# Patient Record
Sex: FEMALE | Race: WHITE | Employment: PART TIME | URBAN - METROPOLITAN AREA
[De-identification: names, ages, dates, MRNs, and addresses within clinical notes are randomized per-mention and may not be internally consistent; named-entity substitution may affect disease eponyms.]

---

## 2021-06-18 ENCOUNTER — APPOINTMENT (OUTPATIENT)
Dept: GENERAL RADIOLOGY | Age: 61
End: 2021-06-18
Payer: OTHER GOVERNMENT

## 2021-06-18 ENCOUNTER — HOSPITAL ENCOUNTER (EMERGENCY)
Age: 61
Discharge: HOME | End: 2021-06-18
Payer: OTHER GOVERNMENT

## 2021-06-18 VITALS
WEIGHT: 185 LBS | BODY MASS INDEX: 28.04 KG/M2 | TEMPERATURE: 99.4 F | HEART RATE: 87 BPM | RESPIRATION RATE: 16 BRPM | SYSTOLIC BLOOD PRESSURE: 121 MMHG | HEIGHT: 68 IN | DIASTOLIC BLOOD PRESSURE: 74 MMHG | OXYGEN SATURATION: 97 %

## 2021-06-18 DIAGNOSIS — R79.89 ELEVATED LIVER FUNCTION TESTS: ICD-10-CM

## 2021-06-18 DIAGNOSIS — J18.9 PNEUMONIA OF RIGHT LUNG DUE TO INFECTIOUS ORGANISM, UNSPECIFIED PART OF LUNG: Primary | ICD-10-CM

## 2021-06-18 LAB
A/G RATIO: 0.9 (ref 1.1–2.2)
ALBUMIN SERPL-MCNC: 3.4 G/DL (ref 3.4–5)
ALP BLD-CCNC: 227 U/L (ref 40–129)
ALT SERPL-CCNC: 102 U/L (ref 10–40)
ANION GAP SERPL CALCULATED.3IONS-SCNC: 14 MMOL/L (ref 3–16)
AST SERPL-CCNC: 54 U/L (ref 15–37)
BACTERIA: ABNORMAL /HPF
BASOPHILS ABSOLUTE: 0 K/UL (ref 0–0.2)
BASOPHILS RELATIVE PERCENT: 0.5 %
BILIRUB SERPL-MCNC: 0.5 MG/DL (ref 0–1)
BILIRUBIN URINE: ABNORMAL
BLOOD, URINE: NEGATIVE
BUN BLDV-MCNC: 17 MG/DL (ref 7–20)
CALCIUM SERPL-MCNC: 9.3 MG/DL (ref 8.3–10.6)
CHLORIDE BLD-SCNC: 98 MMOL/L (ref 99–110)
CLARITY: ABNORMAL
CO2: 18 MMOL/L (ref 21–32)
COLOR: ABNORMAL
CREAT SERPL-MCNC: 0.9 MG/DL (ref 0.6–1.2)
EOSINOPHILS ABSOLUTE: 0.1 K/UL (ref 0–0.6)
EOSINOPHILS RELATIVE PERCENT: 1.1 %
EPITHELIAL CELLS, UA: 8 /HPF (ref 0–5)
GFR AFRICAN AMERICAN: >60
GFR NON-AFRICAN AMERICAN: >60
GLOBULIN: 4 G/DL
GLUCOSE BLD-MCNC: 118 MG/DL (ref 70–99)
GLUCOSE URINE: NEGATIVE MG/DL
HCT VFR BLD CALC: 44.3 % (ref 36–48)
HEMOGLOBIN: 14.7 G/DL (ref 12–16)
HYALINE CASTS: 13 /LPF (ref 0–8)
KETONES, URINE: NEGATIVE MG/DL
LACTIC ACID, SEPSIS: 0.8 MMOL/L (ref 0.4–1.9)
LEUKOCYTE ESTERASE, URINE: ABNORMAL
LYMPHOCYTES ABSOLUTE: 0.6 K/UL (ref 1–5.1)
LYMPHOCYTES RELATIVE PERCENT: 7.3 %
MCH RBC QN AUTO: 29.9 PG (ref 26–34)
MCHC RBC AUTO-ENTMCNC: 33.1 G/DL (ref 31–36)
MCV RBC AUTO: 90.4 FL (ref 80–100)
MICROSCOPIC EXAMINATION: YES
MONOCYTES ABSOLUTE: 0.6 K/UL (ref 0–1.3)
MONOCYTES RELATIVE PERCENT: 6.3 %
NEUTROPHILS ABSOLUTE: 7.4 K/UL (ref 1.7–7.7)
NEUTROPHILS RELATIVE PERCENT: 84.8 %
NITRITE, URINE: NEGATIVE
PDW BLD-RTO: 15.6 % (ref 12.4–15.4)
PH UA: 5.5 (ref 5–8)
PLATELET # BLD: 248 K/UL (ref 135–450)
PMV BLD AUTO: 6.8 FL (ref 5–10.5)
POTASSIUM REFLEX MAGNESIUM: 3.9 MMOL/L (ref 3.5–5.1)
PROTEIN UA: ABNORMAL MG/DL
RAPID INFLUENZA  B AGN: NEGATIVE
RAPID INFLUENZA A AGN: NEGATIVE
RBC # BLD: 4.91 M/UL (ref 4–5.2)
RBC UA: ABNORMAL /HPF (ref 0–4)
SARS-COV-2, NAAT: NOT DETECTED
SODIUM BLD-SCNC: 130 MMOL/L (ref 136–145)
SPECIFIC GRAVITY UA: 1.02 (ref 1–1.03)
TOTAL PROTEIN: 7.4 G/DL (ref 6.4–8.2)
URINE REFLEX TO CULTURE: YES
URINE TYPE: ABNORMAL
UROBILINOGEN, URINE: 1 E.U./DL
WBC # BLD: 8.8 K/UL (ref 4–11)
WBC UA: 22 /HPF (ref 0–5)

## 2021-06-18 PROCEDURE — 87086 URINE CULTURE/COLONY COUNT: CPT

## 2021-06-18 PROCEDURE — 81001 URINALYSIS AUTO W/SCOPE: CPT

## 2021-06-18 PROCEDURE — 99283 EMERGENCY DEPT VISIT LOW MDM: CPT

## 2021-06-18 PROCEDURE — 71045 X-RAY EXAM CHEST 1 VIEW: CPT

## 2021-06-18 PROCEDURE — 85025 COMPLETE CBC W/AUTO DIFF WBC: CPT

## 2021-06-18 PROCEDURE — 87635 SARS-COV-2 COVID-19 AMP PRB: CPT

## 2021-06-18 PROCEDURE — 87804 INFLUENZA ASSAY W/OPTIC: CPT

## 2021-06-18 PROCEDURE — 83605 ASSAY OF LACTIC ACID: CPT

## 2021-06-18 PROCEDURE — 87040 BLOOD CULTURE FOR BACTERIA: CPT

## 2021-06-18 PROCEDURE — 80053 COMPREHEN METABOLIC PANEL: CPT

## 2021-06-18 RX ORDER — LEVOFLOXACIN 750 MG/1
750 TABLET ORAL DAILY
Qty: 7 TABLET | Refills: 0 | Status: SHIPPED | OUTPATIENT
Start: 2021-06-18 | End: 2021-06-25

## 2021-06-18 RX ORDER — GABAPENTIN 300 MG/1
300 CAPSULE ORAL DAILY
COMMUNITY

## 2021-06-18 ASSESSMENT — ENCOUNTER SYMPTOMS
EYE REDNESS: 0
WHEEZING: 0
COLOR CHANGE: 0
ABDOMINAL PAIN: 0
CHOKING: 0
APNEA: 0
CHEST TIGHTNESS: 0
COUGH: 1
CONSTIPATION: 0
VOMITING: 0
DIARRHEA: 0
BACK PAIN: 0
STRIDOR: 0
EYE DISCHARGE: 0
SHORTNESS OF BREATH: 0
NAUSEA: 0

## 2021-06-18 NOTE — ED NOTES
Bed: 22  Expected date:   Expected time:   Means of arrival:   Comments:  June Mccloud RN  06/18/21 2411

## 2021-06-19 NOTE — ED PROVIDER NOTES
[facility]        Pt Name: Juan Carlos Marroquin  MRN: [id number]  [place] 1960  Date of evaluation: 6/18/2021  Provider: HUGH Morales  PCP: Nabil Blum  Note Started: 9:01 PM EDT       BOBO. I have evaluated this patient. My supervising physician was available for consultation. CHIEF COMPLAINT       Chief Complaint   Patient presents with    Fever     pt has had fever since Monday with body aches. pt states her urine has been very dark x2 days       HISTORY OF PRESENT ILLNESS   (Location, Timing/Onset, Context/Setting, Quality, Duration, Modifying Factors, Severity, Associated Signs and Symptoms)  Note limiting factors. Juan Carlos Marroquin is a 59 y.o. female with past medical history of depression who presents to the ED with complaint of a fever. Patient states she has had fever since Monday. States fever chills with intermittent body aches. States she has had associated nonproductive cough and headache. Patient states she is vaccinated against COVID-19. Patient denies any sick contacts or recent travel. States her urine has been dark for the past couple days but denies any dysuria, frequency, urgency or hematuria. Denies vaginal bleeding or discharge. Denies nausea, vomiting, decreased oral intake, abdominal pain, changes in bowel movements, chest pain or shortness of breath. Denies rashes or lesions. Denies any neck pain or stiffness. Patient states she has generalized body aches that she rates a 6/10. Denies worst headache of life or thunderclap onset. Has been taking over-the-counter medication with improvement of fever. Became concerned and came to the ED for further evaluation treatment. Nursing Notes were all reviewed and agreed with or any disagreements were addressed in the HPI.     REVIEW OF SYSTEMS    (2-9 systems for level 4, 10 or more for level 5)     Review of Systems   Constitutional: Positive for chills and fever. Negative for activity change, appetite change, diaphoresis and fatigue. HENT: Negative. Eyes: Negative for discharge and redness. Respiratory: Positive for cough. Negative for apnea, choking, chest tightness, shortness of breath, wheezing and stridor. Cardiovascular: Negative. Negative for chest pain, palpitations and leg swelling. Gastrointestinal: Negative for abdominal pain, constipation, diarrhea, nausea and vomiting. Genitourinary: Negative for decreased urine volume, difficulty urinating, dysuria, flank pain, frequency, hematuria and urgency. Musculoskeletal: Positive for arthralgias and myalgias. Negative for back pain, gait problem, joint swelling, neck pain and neck stiffness. Skin: Negative for color change, pallor, rash and wound. Neurological: Positive for headaches. Negative for dizziness and light-headedness. Positives and Pertinent negatives as per HPI. Except as noted above in the ROS, all other systems were reviewed and negative. PAST MEDICAL HISTORY     Past Medical History:   Diagnosis Date    Depression          SURGICAL HISTORY   History reviewed. No pertinent surgical history. [address]       Discharge Medication List as of 6/18/2021  9:08 PM      CONTINUE these medications which have NOT CHANGED    Details   gabapentin (NEURONTIN) 300 MG capsule Take 300 mg by mouth daily. Historical Med      sertraline (ZOLOFT) 50 MG tablet Take 50 mg by mouth dailyHistorical Med               ALLERGIES     Patient has no known allergies. FAMILYHISTORY     History reviewed. No pertinent family history.        SOCIAL HISTORY       Social History     Tobacco Use    Smoking status: Current Every Day Smoker     Packs/day: 1.00     Types: Cigarettes   Substance Use Topics    Alcohol use: Not on file    Drug use: Not on file       SCREENINGS             PHYSICAL EXAM    (up to 7 for level 4, 8 or more for level 5)     ED Triage Neurological:      Mental Status: She is alert and oriented to person, place, and time.    Psychiatric:         Behavior: Behavior normal.         DIAGNOSTIC RESULTS   LABS:    Labs Reviewed   URINE RT REFLEX TO CULTURE - Abnormal; Notable for the following components:       Result Value    Clarity, UA CLOUDY (*)     Bilirubin Urine SMALL (*)     Protein, UA TRACE (*)     Leukocyte Esterase, Urine SMALL (*)     All other components within normal limits    Narrative:     Performed at:  [facility] [address] [facility] [employer]  [place]   Phone [phone]   CBC WITH AUTO DIFFERENTIAL - Abnormal; Notable for the following components:    RDW 15.6 (*)     Lymphocytes Absolute 0.6 (*)     All other components within normal limits    Narrative:     Performed at:  [facility] [address] [facility] [facility] [employer]   Phone [phone]   COMPREHENSIVE METABOLIC PANEL W/ REFLEX TO MG FOR LOW K - Abnormal; Notable for the following components:    Sodium 130 (*)     Chloride 98 (*)     CO2 18 (*)     Glucose 118 (*)     Albumin/Globulin Ratio 0.9 (*)     Alkaline Phosphatase 227 (*)      (*)     AST 54 (*)     All other components within normal limits    Narrative:     Performed at:  [facility] [address] [facility] [employer]   Phone [phone]   MICROSCOPIC URINALYSIS - Abnormal; Notable for the following components:    Bacteria, UA 1+ (*)     Hyaline Casts, UA 13 (*)     WBC, UA 22 (*)     Epithelial Cells, UA 8 (*)     All other components within normal limits    Narrative:     Performed at:  [facility] [address] [place] [employer]   Phone [phone]   RAPID INFLUENZA A/B ANTIGENS    Narrative:     Performed at:  [facility] [address] [place] [employer]   Phone [phone], RAPID    Narrative: Performed at:  [facility]  [address] [facility], [address]   Phone [phone]   CULTURE, BLOOD 1   CULTURE, BLOOD 2   CULTURE, URINE   LACTATE, SEPSIS    Narrative:     Performed at:  [facility]  [address] [facility], [address]   Phone [phone]       All other labs were within normal range or not returned as of this dictation. EKG: All EKG's are interpreted by the Emergency Department Physician in the absence of a cardiologist.  Please see their note for interpretation of EKG. RADIOLOGY:   Non-plain film images such as CT, Ultrasound and MRI are read by the radiologist. Plain radiographic images are visualized and preliminarily interpreted by the ED Provider with the below findings:        Interpretation per the Radiologist below, if available at the time of this note:    XR CHEST PORTABLE   Final Result   Right-sided airspace disease. Correlate with presentation for pneumonia. Follow-up to resolution recommended. XR CHEST PORTABLE    Result Date: 6/18/2021  EXAMINATION: ONE XRAY VIEW OF THE CHEST 6/18/2021 6:53 pm COMPARISON: None. HISTORY: ORDERING SYSTEM PROVIDED HISTORY: fever TECHNOLOGIST PROVIDED HISTORY: Reason for exam:->fever Reason for Exam: fever Acuity: Unknown Type of Exam: Unknown FINDINGS: Cardiac silhouette is within normal limits in size. Mediastinal contours are otherwise suboptimally evaluated due to rotated projection. Right upper/mid lung opacities. Trace right pleural effusion. Right-sided airspace disease. Correlate with presentation for pneumonia. Follow-up to resolution recommended.            PROCEDURES   Unless otherwise noted below, none     Procedures    CRITICAL CARE TIME   N/A    CONSULTS:  None      EMERGENCY DEPARTMENT COURSE and DIFFERENTIAL DIAGNOSIS/MDM:   Vitals:    Vitals:    06/18/21 2000 06/18/21 2030 06/18/21 2051 06/18/21 2100   BP: 100/68 120/80 120/80 121/74 Pulse: 89   87   Resp:    16   Temp:       TempSrc:       SpO2: 98% 97%  97%   Weight:       Height:           Patient was given the following medications:  Medications - No data to display        Patient is a 58-year-old female who presents to the ED with complaint of a fever. Has had fever, chills and body aches for the past several days. Upon arrival patient afebrile with stable vital signs. Denies any further complaints. IV established and blood work obtained. CBC showed normal white count, hemoglobin and platelets. CMP did show alk phos of 227 with  and AST of 54 but specimen was hemolyzed. Lactic acid normal.  Blood cultures x2 obtained and pending at this time. Flu swab was negative. Rapid Covid negative. Urinalysis showed 1+ bacteria 22 white blood cells. Patient suffering from fever, chills and body aches concerning for potential acute cystitis but he also has chest x-ray which showed right-sided airspace disease. Patient not hypoxic. Believe patient be safely discharged home. Will start on Levaquin. Follow-up with PCP. Return the ED for any worsening symptoms. Low suspicion for COVID-19, influenza, sepsis, respiratory distress, surgical abdomen, pyelonephritis or other emergent etiology at this time. FINAL IMPRESSION      1. Pneumonia of right lung due to infectious organism, unspecified part of lung    2. Elevated liver function tests          DISPOSITION/PLAN   DISPOSITION Decision To Discharge 06/18/2021 08:52:10 PM      PATIENT REFERRED TO:  Eugenie Grider  [address] [place] [address]  [phone]    Schedule an appointment as soon as possible for a visit   For a Re-check in   3-5   days.     [facility]  [address] [facility]  [address] [address]  [phone]  Go to   As needed, If symptoms worsen      DISCHARGE MEDICATIONS:  Discharge Medication List as of 6/18/2021  9:08 PM      START taking these medications    Details levoFLOXacin (LEVAQUIN) 750 MG tablet Take 1 tablet by mouth daily for 7 days, Disp-7 tablet, R-0Print             DISCONTINUED MEDICATIONS:  Discharge Medication List as of 6/18/2021  9:08 PM                 (Please note that portions of this note were completed with a voice recognition program.  Efforts were made to edit the dictations but occasionally words are mis-transcribed.)    HUGH Gonzalez (electronically signed)          HUGH Gilbert  06/18/21 2905

## 2021-06-20 LAB — URINE CULTURE, ROUTINE: NORMAL

## 2021-06-22 LAB
BLOOD CULTURE, ROUTINE: NORMAL
CULTURE, BLOOD 2: NORMAL

## 2022-12-28 ENCOUNTER — APPOINTMENT (OUTPATIENT)
Dept: GENERAL RADIOLOGY | Age: 62
End: 2022-12-28
Payer: OTHER GOVERNMENT

## 2022-12-28 ENCOUNTER — HOSPITAL ENCOUNTER (OUTPATIENT)
Age: 62
Setting detail: OBSERVATION
Discharge: HOME | End: 2022-12-30
Attending: INTERNAL MEDICINE | Admitting: INTERNAL MEDICINE
Payer: OTHER GOVERNMENT

## 2022-12-28 ENCOUNTER — APPOINTMENT (OUTPATIENT)
Dept: CT IMAGING | Age: 62
End: 2022-12-28
Payer: OTHER GOVERNMENT

## 2022-12-28 DIAGNOSIS — I26.93 SINGLE SUBSEGMENTAL PULMONARY EMBOLISM WITHOUT ACUTE COR PULMONALE (HCC): Primary | ICD-10-CM

## 2022-12-28 DIAGNOSIS — U07.1 COVID: ICD-10-CM

## 2022-12-28 PROBLEM — I26.99 ACUTE PULMONARY EMBOLISM WITHOUT ACUTE COR PULMONALE, UNSPECIFIED PULMONARY EMBOLISM TYPE (HCC): Status: ACTIVE | Noted: 2022-12-28

## 2022-12-28 PROBLEM — J44.9 SUSPECTED CHRONIC OBSTRUCTIVE PULMONARY DISEASE BASED ON INITIAL EVALUATION (HCC): Status: ACTIVE | Noted: 2022-12-28

## 2022-12-28 PROBLEM — Z72.0 TOBACCO ABUSE: Status: ACTIVE | Noted: 2022-12-28

## 2022-12-28 LAB
A/G RATIO: 1 (ref 1.1–2.2)
ALBUMIN SERPL-MCNC: 3.5 G/DL (ref 3.4–5)
ALP BLD-CCNC: 260 U/L (ref 40–129)
ALT SERPL-CCNC: 65 U/L (ref 10–40)
ANION GAP SERPL CALCULATED.3IONS-SCNC: 12 MMOL/L (ref 3–16)
AST SERPL-CCNC: 39 U/L (ref 15–37)
BASOPHILS ABSOLUTE: 0 K/UL (ref 0–0.2)
BASOPHILS RELATIVE PERCENT: 0.6 %
BILIRUB SERPL-MCNC: 0.5 MG/DL (ref 0–1)
BUN BLDV-MCNC: 11 MG/DL (ref 7–20)
C-REACTIVE PROTEIN: 121.8 MG/L (ref 0–5.1)
CALCIUM SERPL-MCNC: 9.6 MG/DL (ref 8.3–10.6)
CHLORIDE BLD-SCNC: 102 MMOL/L (ref 99–110)
CO2: 25 MMOL/L (ref 21–32)
CREAT SERPL-MCNC: 0.7 MG/DL (ref 0.6–1.2)
D DIMER: 3.47 UG/ML FEU (ref 0–0.6)
EKG ATRIAL RATE: 84 BPM
EKG DIAGNOSIS: NORMAL
EKG P AXIS: 74 DEGREES
EKG P-R INTERVAL: 154 MS
EKG Q-T INTERVAL: 386 MS
EKG QRS DURATION: 62 MS
EKG QTC CALCULATION (BAZETT): 456 MS
EKG R AXIS: 11 DEGREES
EKG T AXIS: 26 DEGREES
EKG VENTRICULAR RATE: 84 BPM
EOSINOPHILS ABSOLUTE: 0.1 K/UL (ref 0–0.6)
EOSINOPHILS RELATIVE PERCENT: 1.6 %
FIBRINOGEN: 509 MG/DL (ref 207–509)
GFR SERPL CREATININE-BSD FRML MDRD: >60 ML/MIN/{1.73_M2}
GLUCOSE BLD-MCNC: 107 MG/DL (ref 70–99)
HCT VFR BLD CALC: 38.4 % (ref 36–48)
HEMOGLOBIN: 12.5 G/DL (ref 12–16)
INR BLD: 1.03 (ref 0.87–1.14)
LACTATE DEHYDROGENASE: 379 U/L (ref 100–190)
LYMPHOCYTES ABSOLUTE: 0.8 K/UL (ref 1–5.1)
LYMPHOCYTES RELATIVE PERCENT: 12 %
MCH RBC QN AUTO: 28.8 PG (ref 26–34)
MCHC RBC AUTO-ENTMCNC: 32.5 G/DL (ref 31–36)
MCV RBC AUTO: 88.6 FL (ref 80–100)
MONOCYTES ABSOLUTE: 0.8 K/UL (ref 0–1.3)
MONOCYTES RELATIVE PERCENT: 11.4 %
NEUTROPHILS ABSOLUTE: 5 K/UL (ref 1.7–7.7)
NEUTROPHILS RELATIVE PERCENT: 74.4 %
PDW BLD-RTO: 13.8 % (ref 12.4–15.4)
PLATELET # BLD: 439 K/UL (ref 135–450)
PMV BLD AUTO: 7.2 FL (ref 5–10.5)
POTASSIUM REFLEX MAGNESIUM: 4.1 MMOL/L (ref 3.5–5.1)
PROCALCITONIN: 0.08 NG/ML (ref 0–0.15)
PROTHROMBIN TIME: 13.4 SEC (ref 11.7–14.5)
RAPID INFLUENZA  B AGN: NEGATIVE
RAPID INFLUENZA A AGN: NEGATIVE
RBC # BLD: 4.33 M/UL (ref 4–5.2)
SARS-COV-2, NAAT: DETECTED
SODIUM BLD-SCNC: 139 MMOL/L (ref 136–145)
TOTAL PROTEIN: 6.9 G/DL (ref 6.4–8.2)
TROPONIN: <0.01 NG/ML
WBC # BLD: 6.7 K/UL (ref 4–11)

## 2022-12-28 PROCEDURE — 71045 X-RAY EXAM CHEST 1 VIEW: CPT

## 2022-12-28 PROCEDURE — G0378 HOSPITAL OBSERVATION PER HR: HCPCS

## 2022-12-28 PROCEDURE — 93005 ELECTROCARDIOGRAM TRACING: CPT | Performed by: INTERNAL MEDICINE

## 2022-12-28 PROCEDURE — 36415 COLL VENOUS BLD VENIPUNCTURE: CPT

## 2022-12-28 PROCEDURE — 83615 LACTATE (LD) (LDH) ENZYME: CPT

## 2022-12-28 PROCEDURE — 6370000000 HC RX 637 (ALT 250 FOR IP): Performed by: INTERNAL MEDICINE

## 2022-12-28 PROCEDURE — 71260 CT THORAX DX C+: CPT | Performed by: PHYSICIAN ASSISTANT

## 2022-12-28 PROCEDURE — 84145 PROCALCITONIN (PCT): CPT

## 2022-12-28 PROCEDURE — 93010 ELECTROCARDIOGRAM REPORT: CPT | Performed by: INTERNAL MEDICINE

## 2022-12-28 PROCEDURE — 86140 C-REACTIVE PROTEIN: CPT

## 2022-12-28 PROCEDURE — 87635 SARS-COV-2 COVID-19 AMP PRB: CPT

## 2022-12-28 PROCEDURE — 85384 FIBRINOGEN ACTIVITY: CPT

## 2022-12-28 PROCEDURE — 85025 COMPLETE CBC W/AUTO DIFF WBC: CPT

## 2022-12-28 PROCEDURE — 84484 ASSAY OF TROPONIN QUANT: CPT

## 2022-12-28 PROCEDURE — 6370000000 HC RX 637 (ALT 250 FOR IP): Performed by: PHYSICIAN ASSISTANT

## 2022-12-28 PROCEDURE — 6360000004 HC RX CONTRAST MEDICATION: Performed by: PHYSICIAN ASSISTANT

## 2022-12-28 PROCEDURE — 99285 EMERGENCY DEPT VISIT HI MDM: CPT

## 2022-12-28 PROCEDURE — 85379 FIBRIN DEGRADATION QUANT: CPT

## 2022-12-28 PROCEDURE — 87804 INFLUENZA ASSAY W/OPTIC: CPT

## 2022-12-28 PROCEDURE — 80053 COMPREHEN METABOLIC PANEL: CPT

## 2022-12-28 PROCEDURE — 82728 ASSAY OF FERRITIN: CPT

## 2022-12-28 PROCEDURE — 85610 PROTHROMBIN TIME: CPT

## 2022-12-28 RX ORDER — ONDANSETRON 2 MG/ML
4 INJECTION INTRAMUSCULAR; INTRAVENOUS EVERY 6 HOURS PRN
Status: DISCONTINUED | OUTPATIENT
Start: 2022-12-28 | End: 2022-12-30 | Stop reason: HOSPADM

## 2022-12-28 RX ORDER — SODIUM CHLORIDE 9 MG/ML
INJECTION, SOLUTION INTRAVENOUS PRN
Status: DISCONTINUED | OUTPATIENT
Start: 2022-12-28 | End: 2022-12-30 | Stop reason: HOSPADM

## 2022-12-28 RX ORDER — ACETAMINOPHEN 650 MG/1
650 SUPPOSITORY RECTAL EVERY 6 HOURS PRN
Status: DISCONTINUED | OUTPATIENT
Start: 2022-12-28 | End: 2022-12-30 | Stop reason: HOSPADM

## 2022-12-28 RX ORDER — ACETAMINOPHEN 325 MG/1
650 TABLET ORAL EVERY 6 HOURS PRN
Status: DISCONTINUED | OUTPATIENT
Start: 2022-12-28 | End: 2022-12-30 | Stop reason: HOSPADM

## 2022-12-28 RX ORDER — ENOXAPARIN SODIUM 100 MG/ML
1 INJECTION SUBCUTANEOUS ONCE
Status: DISCONTINUED | OUTPATIENT
Start: 2022-12-28 | End: 2022-12-28

## 2022-12-28 RX ORDER — SODIUM CHLORIDE 0.9 % (FLUSH) 0.9 %
10 SYRINGE (ML) INJECTION PRN
Status: DISCONTINUED | OUTPATIENT
Start: 2022-12-28 | End: 2022-12-30 | Stop reason: HOSPADM

## 2022-12-28 RX ORDER — LANOLIN ALCOHOL/MO/W.PET/CERES
3 CREAM (GRAM) TOPICAL NIGHTLY PRN
Status: DISCONTINUED | OUTPATIENT
Start: 2022-12-28 | End: 2022-12-30 | Stop reason: HOSPADM

## 2022-12-28 RX ORDER — MAGNESIUM SULFATE 1 G/100ML
1000 INJECTION INTRAVENOUS PRN
Status: DISCONTINUED | OUTPATIENT
Start: 2022-12-28 | End: 2022-12-30 | Stop reason: HOSPADM

## 2022-12-28 RX ORDER — POTASSIUM CHLORIDE 7.45 MG/ML
10 INJECTION INTRAVENOUS PRN
Status: DISCONTINUED | OUTPATIENT
Start: 2022-12-28 | End: 2022-12-30 | Stop reason: HOSPADM

## 2022-12-28 RX ORDER — GABAPENTIN 300 MG/1
300 CAPSULE ORAL ONCE
Status: COMPLETED | OUTPATIENT
Start: 2022-12-28 | End: 2022-12-28

## 2022-12-28 RX ORDER — POTASSIUM CHLORIDE 20 MEQ/1
40 TABLET, EXTENDED RELEASE ORAL PRN
Status: DISCONTINUED | OUTPATIENT
Start: 2022-12-28 | End: 2022-12-30 | Stop reason: HOSPADM

## 2022-12-28 RX ORDER — CALCIUM CARBONATE 200(500)MG
1000 TABLET,CHEWABLE ORAL 3 TIMES DAILY PRN
Status: DISCONTINUED | OUTPATIENT
Start: 2022-12-28 | End: 2022-12-30 | Stop reason: HOSPADM

## 2022-12-28 RX ORDER — IPRATROPIUM BROMIDE AND ALBUTEROL SULFATE 2.5; .5 MG/3ML; MG/3ML
1 SOLUTION RESPIRATORY (INHALATION) 4 TIMES DAILY PRN
Status: DISCONTINUED | OUTPATIENT
Start: 2022-12-28 | End: 2022-12-30 | Stop reason: HOSPADM

## 2022-12-28 RX ORDER — ONDANSETRON 4 MG/1
4 TABLET, ORALLY DISINTEGRATING ORAL EVERY 8 HOURS PRN
Status: DISCONTINUED | OUTPATIENT
Start: 2022-12-28 | End: 2022-12-30 | Stop reason: HOSPADM

## 2022-12-28 RX ORDER — NICOTINE 21 MG/24HR
1 PATCH, TRANSDERMAL 24 HOURS TRANSDERMAL DAILY
Status: DISCONTINUED | OUTPATIENT
Start: 2022-12-28 | End: 2022-12-30 | Stop reason: HOSPADM

## 2022-12-28 RX ORDER — GABAPENTIN 300 MG/1
300 CAPSULE ORAL DAILY
Status: DISCONTINUED | OUTPATIENT
Start: 2022-12-29 | End: 2022-12-28

## 2022-12-28 RX ADMIN — GABAPENTIN 300 MG: 300 CAPSULE ORAL at 23:04

## 2022-12-28 RX ADMIN — IOPAMIDOL 75 ML: 755 INJECTION, SOLUTION INTRAVENOUS at 14:48

## 2022-12-28 RX ADMIN — MELATONIN TAB 3 MG 3 MG: 3 TAB at 23:04

## 2022-12-28 RX ADMIN — APIXABAN 10 MG: 5 TABLET, FILM COATED ORAL at 23:03

## 2022-12-28 ASSESSMENT — PAIN DESCRIPTION - PAIN TYPE: TYPE: CHRONIC PAIN

## 2022-12-28 ASSESSMENT — PAIN DESCRIPTION - ORIENTATION: ORIENTATION: LOWER

## 2022-12-28 ASSESSMENT — PAIN DESCRIPTION - LOCATION: LOCATION: BACK

## 2022-12-28 ASSESSMENT — LIFESTYLE VARIABLES
HOW OFTEN DO YOU HAVE A DRINK CONTAINING ALCOHOL: 2-3 TIMES A WEEK
HOW MANY STANDARD DRINKS CONTAINING ALCOHOL DO YOU HAVE ON A TYPICAL DAY: 1 OR 2

## 2022-12-28 ASSESSMENT — PAIN SCALES - GENERAL: PAINLEVEL_OUTOF10: 6

## 2022-12-28 ASSESSMENT — PAIN - FUNCTIONAL ASSESSMENT
PAIN_FUNCTIONAL_ASSESSMENT: PREVENTS OR INTERFERES SOME ACTIVE ACTIVITIES AND ADLS
PAIN_FUNCTIONAL_ASSESSMENT: NONE - DENIES PAIN

## 2022-12-28 NOTE — H&P
Hospital Medicine History & Physical      Patient: Josy Delgado  :    MRN:  [id number]    Date of Service: 22    Chief Complaint   Patient presents with    Positive For Covid-19     Pt tested positive for Covid on the  , ,. Pt states she's now become SOB and wasn't before, denies any cardiac hc. Pt states she had pneumonia last year. HISTORY OF PRESENT ILLNESS:    Josy Delgado is a 57 y.o. female. She presented to the ER from home by private vehicle. She has been ill with COVID-19 for about two weeks. First symptom was fatigue and loss of taste on 12/15. She first tested positive for COVID-19 on 22. She has generally continued to feel tired, have a poor appetite, some nausea, and also diarrhea. She relates in general she has had minimal cough. For the last 2 days she has felt shortness of breath with exertion which has been forcing her to slow down. Patient is a smoker. She has not been formally diagnosed with any lung diseases as far as she knows. Review of Systems:  All pertinent positives and negatives are as noted in the HPI section. All other systems were reviewed and are negative. Past Medical History:   Diagnosis Date    Depression        Surgical History  No surgical history      Prior to Admission medications    Medication Sig Start Date End Date Taking? Authorizing Provider   gabapentin (NEURONTIN) 300 MG capsule Take 300 mg by mouth daily. Historical Provider, MD   sertraline (ZOLOFT) 50 MG tablet Take 50 mg by mouth daily  Patient not taking: Reported on 2022    Historical Provider, MD       Allergies:   Patient has no known allergies. confirmed    Social:   reports that she has been smoking cigarettes. She has been smoking an average of 1 pack per day. She does not have any smokeless tobacco history on file. has no history on file for alcohol use.   Social History     Substance and Sexual Activity   Drug Use Not on file       Family History  No known pertinent family history    PHYSICAL EXAM:  I performed this physical examination. Vitals:  Patient Vitals for the past 24 hrs:   BP Temp Pulse Resp SpO2 Height Weight   12/28/22 1312 (!) 156/81 97.7 °F (36.5 °C) 83 20 98 % 5' 8\" (1.727 m) 175 lb (79.4 kg)     Room air    GEN:  Appearance:  age appropriate WF in NAD . Level of Consciousness:  alert . Orientation:  full    HEENT: Sclera anicteric.  no conjunctival chemosis. moist mucus membranes. no specific or diagnostic oral lesions. NECK:  no signs of meningismus. Jugular veins not distended. Carotid pulses  2+.  no cervical lymphadenopathy. no thyromegaly. CV:  regular rhythm. normal S1 & S2.    no murmur. no rub.  no gallop. PULM:  Chest excursion is symmetric. Breath sounds are generally reduced but of vesicular character. Adventitious sounds:  scattered non-confluent crackles audible in all fields    AB:  Abdominal shape is normal.  Bowel sounds are active. Generally soft to palpation. no tenderness is present. no involuntary guarding. no rebound guarding. EXTR:  Skin is warm. Capillary refill brisk. no specific or pathognomic rash. no clubbing. no pitting edema. no active wound or ulcer. Pulses 2+ x 4      LABS:  Lab Results   Component Value Date    WBC 6.7 12/28/2022    HGB 12.5 12/28/2022    HCT 38.4 12/28/2022    MCV 88.6 12/28/2022     12/28/2022     Lab Results   Component Value Date    CREATININE 0.7 12/28/2022    BUN 11 12/28/2022     12/28/2022    K 4.1 12/28/2022     12/28/2022    CO2 25 12/28/2022     Lab Results   Component Value Date    ALT 65 (H) 12/28/2022    AST 39 (H) 12/28/2022    ALKPHOS 260 (H) 12/28/2022    BILITOT 0.5 12/28/2022     Lab Results   Component Value Date    TROPONINI <0.01 12/28/2022     No results for input(s): PHART, MER8EVQ, PO2ART in the last 72 hours.     IMAGING:  XR CHEST PORTABLE    Result Date: 12/28/2022  EXAMINATION: ONE XRAY VIEW OF THE CHEST 12/28/2022 12:44 pm COMPARISON: 06/18/2021. HISTORY: ORDERING SYSTEM PROVIDED HISTORY: SOB TECHNOLOGIST PROVIDED HISTORY: Reason for exam:->SOB Reason for Exam: SOB FINDINGS: There are patchy left basilar opacities. The lungs and costophrenic angles are otherwise clear. The cardiomediastinal silhouette and pulmonary vessels appear normal.     Patchy left basal opacities that could reflect atelectasis or pneumonia. Radiographic follow-up may be helpful. CT CHEST PULMONARY EMBOLISM W CONTRAST    Result Date: 12/28/2022  EXAMINATION: CTA OF THE CHEST 12/28/2022 2:49 pm TECHNIQUE: CTA of the chest was performed after the administration of intravenous contrast.  Multiplanar reformatted images are provided for review. MIP images are provided for review. Automated exposure control, iterative reconstruction, and/or weight based adjustment of the mA/kV was utilized to reduce the radiation dose to as low as reasonably achievable. COMPARISON: None. HISTORY: ORDERING SYSTEM PROVIDED HISTORY: Chest Pain TECHNOLOGIST PROVIDED HISTORY: Reason for exam:->Chest Pain Decision Support Exception - unselect if not a suspected or confirmed emergency medical condition->Emergency Medical Condition (MA) Reason for Exam: Positive For Covid-19 (Pt tested positive for Covid on the 17th ,21st ,25th. Pt states she's now become SOB and wasn't before, denies any cardiac hc. Pt states she had pneumonia last year. ) FINDINGS: Pulmonary Arteries: The study is of good technical quality. Acute pulmonary embolism is confirmed within a segmental branch of the left lower lobe pulmonary artery supplying the lingula. RV: LV ratio is 1.0. Mediastinum: Heart normal.  Normal aorta. Shotty mediastinal and right hilar lymph nodes are mildly prominent. Thyroid and esophagus unremarkable. Lungs/pleura: The airways are patent.   Trace pleural fluid in the left lower chest.  Reticular and multifocal ground-glass opacities are present bilaterally with a peripheral lower zone predominance. No discrete mass or nodule. Underlying pattern of moderate centrilobular emphysema. Upper Abdomen: 2.1 cm left adrenal adenoma. Normal right adrenal gland. Soft Tissues/Bones: No skeletal abnormalities throughout the chest.     1. Acute pulmonary embolism in a segmental branch of the left lower lobar pulmonary artery. No evidence of right ventricular strain. 2. Multifocal patchy airspace opacities with a peripheral lower zone predominance. Overall pattern would favor a viral pattern of pneumonia. 3. Asymmetric reactive mediastinal and right hilar lymph nodes. Findings were discussed with CLAUDETTE SALDAÑA at 3:28 pm on 12/28/2022. I directly reviewed all recent imaging studies as well as pertinent prior studies. Radiology reports may or may not be available at the time of my review. EKG:  New and pertinent prior tracings were directly reviewed. My interpretation is as follows:  Normal sinus rhythm    Active Hospital Problems    Diagnosis Date Noted    Acute pulmonary embolism without acute cor pulmonale, unspecified pulmonary embolism type ([address] [I26.99] 12/28/2022     Priority: Medium    COVID-19 [U07.1] 12/28/2022     Priority: Medium    Tobacco abuse [Z72.0] 12/28/2022     Priority: Medium    Suspected chronic obstructive pulmonary disease based on initial evaluation [facility]) [J44.9] 12/28/2022     Priority: Medium       ASSESSMENT & PLAN  Acute PE  -  Left lower lobe segmental PE. Associated with COVID-19 infection. Low clot burden. Satisfactory hemodynamics. No oxygen requirement. No e/o RV strain by CT criteria. Will start apixaban 10mg BID x 7 days then 5mg BID for 3-6 months. -  Duplex venous U/S of the legs requested to localize source DVT and determine if thrombectomy and/or catheter directed thrombolysis are warranted. Patient has no clinical exam signs of extensive DVT.     COVID-19  -  First diagnosed 12/17/22. Symptom onset 12/15. It would be unusual for respiratory disease directly r/t COVID-19 to worsen at this point, 13 days after onset. However, initial results of inflammatory indices are returning c/w a hyperinflammatory state so close monitoring is warranted. If patient develops any O2 requirement prompt initiation of steroids would be indicated. -  Titrate level of respiratory support according to patient's needs. Target goal SpO2 = 90-94%. Currently patient is requiring no support. -  General supportive care to include as needed antipyretics, antiemetics, antitussives, bronchodilators, etc..  -  Monitor COVID-associated inflammatory indices. Tobacco Abuse, Suspected COPD  -  Imaging findings c/w COPD. No PFT's found.  -  Smoking cessation advised and NRT provided. DVT prophylaxis: apixaban   Code Status:  Full  Disposition:  Observation pending duplex venous U/S. Anticipate d/c to home in 1-2 days. Follows up through the [place].     Baylee Delgado MD MD

## 2022-12-28 NOTE — ED PROVIDER NOTES
[facility]        Pt Name: Zannie Paget  MRN: [id number]  [place] 1960  Date of evaluation: 12/28/2022  Provider: Cassandra Mackey PA-C  PCP: Radha Silver  Note Started: 1:55 PM EST       BOBO. I have evaluated this patient. My supervising physician was available for consultation. CHIEF COMPLAINT       Chief Complaint   Patient presents with    Positive For Covid-19     Pt tested positive for Covid on the 17th ,21st ,25th. Pt states she's now become SOB and wasn't before, denies any cardiac hc. Pt states she had pneumonia last year. HISTORY OF PRESENT ILLNESS   (Location, Timing/Onset, Context/Setting, Quality, Duration, Modifying Factors, Severity, Associated Signs and Symptoms)  Note limiting factors. Chief Complaint: SOB, covid     Zannie Paget is a 57 y.o. female who presents complaining of shortness of breath for 2 days. Patient reports she tested positive for COVID on 12/17, again on 12/21, again on [place]. She reports for the last 2 days feeling worsening shortness of breath, dyspnea on exertion. Denies continued fever, chest pain, lower extremity edema, syncope or dizziness. Patient is a smoker, has decreased cigarette use since becoming ill. Denies history of COPD. Nursing Notes were all reviewed and agreed with or any disagreements were addressed in the HPI. REVIEW OF SYSTEMS    (2-9 systems for level 4, 10 or more for level 5)     Review of Systems   All other systems reviewed and are negative. Positives and Pertinent negatives as per HPI. Except as noted above in the ROS, all other systems were reviewed and negative. PAST MEDICAL HISTORY     Past Medical History:   Diagnosis Date    Depression          SURGICAL HISTORY   No past surgical history on file.       CURRENTMEDICATIONS       Previous Medications    GABAPENTIN (NEURONTIN) 300 MG CAPSULE    Take 300 mg by mouth daily.    SERTRALINE (ZOLOFT) 50 MG TABLET    Take 50 mg by mouth daily         ALLERGIES     Patient has no known allergies. FAMILYHISTORY     No family history on file. SOCIAL HISTORY       Social History     Tobacco Use    Smoking status: Every Day     Packs/day: 1.00     Types: Cigarettes       SCREENINGS    [place] Coma Scale  Eye Opening: Spontaneous  Best Verbal Response: Oriented  Best Motor Response: Obeys commands  [place] Coma Scale Score: 15        PHYSICAL EXAM    (up to 7 for level 4, 8 or more for level 5)     ED Triage Vitals [12/28/22 1312]   BP Temp Temp src Heart Rate Resp SpO2 Height Weight   (!) 156/81 97.7 °F (36.5 °C) -- 83 20 98 % 5' 8\" (1.727 m) 175 lb (79.4 kg)       Physical Exam  Vitals and nursing note reviewed. Constitutional:       General: She is not in acute distress. Appearance: She is not ill-appearing or toxic-appearing. HENT:      Head: Normocephalic and atraumatic. Right Ear: External ear normal.      Left Ear: External ear normal.      Nose: Nose normal.      Mouth/Throat:      Mouth: Mucous membranes are moist.      Pharynx: Oropharynx is clear. Eyes:      Conjunctiva/sclera: Conjunctivae normal.   Cardiovascular:      Rate and Rhythm: Normal rate and regular rhythm. Pulses: Normal pulses. Heart sounds: Normal heart sounds. Pulmonary:      Effort: Pulmonary effort is normal. No respiratory distress. Breath sounds: Normal breath sounds. Abdominal:      General: Abdomen is flat. Bowel sounds are normal. There is no distension. Palpations: Abdomen is soft. Tenderness: There is no abdominal tenderness. There is no guarding or rebound. Musculoskeletal:         General: Normal range of motion. Cervical back: Normal range of motion and neck supple. Skin:     General: Skin is warm and dry. Capillary Refill: Capillary refill takes less than 2 seconds.    Neurological:      Mental Status: She is alert and oriented to person, place, and time. Psychiatric:         Mood and Affect: Mood normal.         Behavior: Behavior normal.       DIAGNOSTIC RESULTS   LABS:    Labs Reviewed   COVID-19, RAPID - Abnormal; Notable for the following components:       Result Value    SARS-CoV-2, NAAT DETECTED (*)     All other components within normal limits   CBC WITH AUTO DIFFERENTIAL - Abnormal; Notable for the following components:    Lymphocytes Absolute 0.8 (*)     All other components within normal limits   COMPREHENSIVE METABOLIC PANEL W/ REFLEX TO MG FOR LOW K - Abnormal; Notable for the following components:    Glucose 107 (*)     Albumin/Globulin Ratio 1.0 (*)     Alkaline Phosphatase 260 (*)     ALT 65 (*)     AST 39 (*)     All other components within normal limits   D-DIMER, QUANTITATIVE - Abnormal; Notable for the following components:    D-Dimer, Quant 3.47 (*)     All other components within normal limits   RAPID INFLUENZA A/B ANTIGENS   TROPONIN       When ordered only abnormal lab results are displayed. All other labs were within normal range or not returned as of this dictation. EKG: When ordered, EKG's are interpreted by the Emergency Department Physician in the absence of a cardiologist.  Please see their note for interpretation of EKG. RADIOLOGY:   Non-plain film images such as CT, Ultrasound and MRI are read by the radiologist. Plain radiographic images are visualized and preliminarily interpreted by the ED Provider with the below findings:        Interpretation per the Radiologist below, if available at the time of this note:    CT CHEST PULMONARY EMBOLISM W CONTRAST   Final Result   1. Acute pulmonary embolism in a segmental branch of the left lower lobar   pulmonary artery. No evidence of right ventricular strain. 2. Multifocal patchy airspace opacities with a peripheral lower zone   predominance. Overall pattern would favor a viral pattern of pneumonia.    3. Asymmetric reactive mediastinal and right hilar lymph nodes. Findings were discussed with CLAUDETTE SALDAÑA at 3:28 pm on 12/28/2022. XR CHEST PORTABLE   Final Result   Patchy left basal opacities that could reflect atelectasis or pneumonia. Radiographic follow-up may be helpful. No results found. PROCEDURES   Unless otherwise noted below, none     Procedures    CRITICAL CARE TIME       CONSULTS:  IP CONSULT TO HOSPITALIST      EMERGENCY DEPARTMENT COURSE and DIFFERENTIAL DIAGNOSIS/MDM:   Vitals:    Vitals:    12/28/22 1312   BP: (!) 156/81   Pulse: 83   Resp: 20   Temp: 97.7 °F (36.5 °C)   SpO2: 98%   Weight: 175 lb (79.4 kg)   Height: 5' 8\" (1.727 m)       Patient was given the following medications:  Medications   apixaban (ELIQUIS) tablet 10 mg (has no administration in time range)     Followed by   apixaban (ELIQUIS) tablet 5 mg (has no administration in time range)   iopamidol (ISOVUE-370) 76 % injection 75 mL (75 mLs IntraVENous Given 12/28/22 1448)         Is this patient to be included in the SEP-1 Core Measure due to severe sepsis or septic shock? No   Exclusion criteria - the patient is NOT to be included for SEP-1 Core Measure due to:  2+ SIRS criteria are not met    1600 -  patient and family member at bedside updated on PE, plan for admission. Agree with plan, will admit to medicine via perfect serve messaging system per hospital protocol. Medicine states they will start Eliquis. FINAL IMPRESSION      1. Single subsegmental pulmonary embolism without acute cor pulmonale (HCC)    2. COVID          DISPOSITION/PLAN   DISPOSITION Decision To Admit 12/28/2022 03:31:57 PM      PATIENT REFERRED TO:  No follow-up provider specified.     DISCHARGE MEDICATIONS:  New Prescriptions    No medications on file       DISCONTINUED MEDICATIONS:  Discontinued Medications    No medications on file              (Please note that portions of this note were completed with a voice recognition program.  Efforts were made to edit the dictations but occasionally words are mis-transcribed. )    John Lew PA-C (electronically signed)            John Lew PA-C  12/28/22 [id number]

## 2022-12-29 LAB
C-REACTIVE PROTEIN: 95.2 MG/L (ref 0–5.1)
D DIMER: 2.15 UG/ML FEU (ref 0–0.6)
FERRITIN: 917.8 NG/ML (ref 15–150)
FIBRINOGEN: 433 MG/DL (ref 207–509)
INR BLD: 1.22 (ref 0.87–1.14)
PROTHROMBIN TIME: 15.4 SEC (ref 11.7–14.5)

## 2022-12-29 PROCEDURE — 86140 C-REACTIVE PROTEIN: CPT

## 2022-12-29 PROCEDURE — G0378 HOSPITAL OBSERVATION PER HR: HCPCS

## 2022-12-29 PROCEDURE — 85384 FIBRINOGEN ACTIVITY: CPT

## 2022-12-29 PROCEDURE — 36415 COLL VENOUS BLD VENIPUNCTURE: CPT

## 2022-12-29 PROCEDURE — 6370000000 HC RX 637 (ALT 250 FOR IP): Performed by: INTERNAL MEDICINE

## 2022-12-29 PROCEDURE — 93970 EXTREMITY STUDY: CPT

## 2022-12-29 PROCEDURE — 85379 FIBRIN DEGRADATION QUANT: CPT

## 2022-12-29 PROCEDURE — 85610 PROTHROMBIN TIME: CPT

## 2022-12-29 PROCEDURE — 93306 TTE W/DOPPLER COMPLETE: CPT

## 2022-12-29 RX ORDER — OXYCODONE HYDROCHLORIDE AND ACETAMINOPHEN 5; 325 MG/1; MG/1
1 TABLET ORAL EVERY 4 HOURS PRN
Status: DISCONTINUED | OUTPATIENT
Start: 2022-12-29 | End: 2022-12-30 | Stop reason: HOSPADM

## 2022-12-29 RX ADMIN — ACETAMINOPHEN 650 MG: 325 TABLET ORAL at 10:17

## 2022-12-29 RX ADMIN — APIXABAN 10 MG: 5 TABLET, FILM COATED ORAL at 09:29

## 2022-12-29 RX ADMIN — OXYCODONE AND ACETAMINOPHEN 1 TABLET: 5; 325 TABLET ORAL at 18:10

## 2022-12-29 RX ADMIN — MELATONIN TAB 3 MG 3 MG: 3 TAB at 20:01

## 2022-12-29 RX ADMIN — APIXABAN 10 MG: 5 TABLET, FILM COATED ORAL at 20:01

## 2022-12-29 RX ADMIN — OXYCODONE AND ACETAMINOPHEN 1 TABLET: 5; 325 TABLET ORAL at 13:01

## 2022-12-29 RX ADMIN — OXYCODONE AND ACETAMINOPHEN 1 TABLET: 5; 325 TABLET ORAL at 22:24

## 2022-12-29 ASSESSMENT — PAIN DESCRIPTION - LOCATION
LOCATION: RIB CAGE

## 2022-12-29 ASSESSMENT — PAIN DESCRIPTION - ORIENTATION
ORIENTATION: LEFT

## 2022-12-29 ASSESSMENT — PAIN SCALES - GENERAL
PAINLEVEL_OUTOF10: 10
PAINLEVEL_OUTOF10: 5
PAINLEVEL_OUTOF10: 6
PAINLEVEL_OUTOF10: 3

## 2022-12-29 ASSESSMENT — PAIN DESCRIPTION - DESCRIPTORS
DESCRIPTORS: ACHING;DISCOMFORT
DESCRIPTORS: ACHING
DESCRIPTORS: DISCOMFORT

## 2022-12-29 NOTE — CARE COORDINATION
Patient admitted as Observation/OPIB with an anticipated short hospitalization length of stay. Chart reviewed and it appears that patient has minimal needs for discharge at this time. *Case management will continue to follow progress and update discharge plan as needed.     Electronically signed by ELSIE Jameson on 12/29/2022 at 9:01 AM'

## 2022-12-29 NOTE — PROGRESS NOTES
4 Eyes Skin Assessment     NAME:  Feroz Ornelas OF BIRTH:  1960  MEDICAL RECORD NUMBER:  [id number]    The patient is being assessed for  Admission    I agree that One RN have performed a thorough Head to Toe Skin Assessment on the patient. ALL assessment sites listed below have been assessed. Areas assessed by both nurses:    Head, Face, Ears, Shoulders, Back, Chest, Arms, Elbows, Hands, Sacrum. Buttock, Coccyx, Ischium, and Legs. Feet and Heels        Does the Patient have a Wound?  No noted wound(s)       [place] Prevention initiated by RN: NA   Wound Care Orders initiated by RN: NA    Pressure Injury (Stage 3,4, Unstageable, DTI, NWPT, and Complex wounds) if present place referral order by RN under : NA    New and Established Ostomies, if present place, referral order under : NA      Nurse 1 eSignature: Electronically signed by Halima العراقي RN on 12/29/22 at 1:46 AM EST    **SHARE this note so that the co-signing nurse is able to place an eSignature**    Nurse 2 eSignature: Electronically signed by Seferino Valiente RN on 12/29/22 at 3:50 AM EST

## 2022-12-29 NOTE — PLAN OF CARE
Pt updated on current plan of care, PRN tylenol given for pain. See flowsheet for assessment.    Problem: Discharge Planning  Goal: Discharge to home or other facility with appropriate resources  12/29/2022 1117 by Suni Funk RN  Outcome: Progressing  Flowsheets (Taken 12/29/2022 0930)  Discharge to home or other facility with appropriate resources: Identify barriers to discharge with patient and caregiver     Problem: Pain  Goal: Verbalizes/displays adequate comfort level or baseline comfort level  12/29/2022 1117 by Suni Funk RN  Outcome: Progressing     Problem: ABCDS Injury Assessment  Goal: Absence of physical injury  12/29/2022 1117 by Suni Funk RN  Outcome: Progressing     Problem: Chronic Conditions and Co-morbidities  Goal: Patient's chronic conditions and co-morbidity symptoms are monitored and maintained or improved  Outcome: Progressing

## 2022-12-29 NOTE — PROGRESS NOTES
Hospitalist Progress Note      PCP: ANNIE NORIEGA    Date of Admission: 12/28/2022    LOS: 0    Chief Complaint:   Chief Complaint   Patient presents with    Positive For Covid-19     Pt tested positive for Covid on the 17th ,21st ,25th. Pt states she's now become SOB and wasn't before, denies any cardiac hc. Pt states she had pneumonia last year.        Case Summary:   62-year-old lady with history of depression, recently diagnosed with COVID-19 pneumonia who presented with fatigue, loss of appetite, nausea and diarrhea, new onset shortness of breath with pleuritic chest pain found to have left lower lobe pulmonary artery pulmonary embolus and bilateral chest imaging      Active Hospital Problems    Diagnosis Date Noted    Acute pulmonary embolism without acute cor pulmonale, unspecified pulmonary embolism type (HCC) [I26.99] 12/28/2022     Priority: Medium    COVID-19 [U07.1] 12/28/2022     Priority: Medium    Tobacco abuse [Z72.0] 12/28/2022     Priority: Medium    Suspected chronic obstructive pulmonary disease based on initial evaluation ([facility]) [J44.9] 12/28/2022     Priority: Medium         Principal Problem:    Acute pulmonary embolism without acute cor pulmonale, unspecified pulmonary embolism type (HCC)  Active Problems:    COVID-19    Tobacco abuse    Suspected chronic obstructive pulmonary disease based on initial evaluation ([facility])  Resolved Problems:    * No resolved hospital problems. *  -Patient already started on oral anticoagulation with Eliquis and will continue  - Oncologist for pleuritic chest pain  - Monitor O2 saturations  - Follow-up echocardiogram to assess cardiac strain      Medications:  Reviewed  Infusion Medications    sodium chloride       Scheduled Medications    apixaban  10 mg Oral BID    Followed by    [START ON 1/4/2023] apixaban  5 mg Oral BID    nicotine  1 patch TransDERmal Daily     PRN Meds: perflutren lipid microspheres, oxyCODONE-acetaminophen, sodium chloride flush,  sodium chloride, potassium chloride **OR** potassium alternative oral replacement **OR** potassium chloride, magnesium sulfate, ondansetron **OR** ondansetron, acetaminophen **OR** acetaminophen, melatonin, calcium carbonate, ipratropium-albuterol      DVT Prophylaxis: On Eliquis  Diet: ADULT DIET; Regular; Low Fat/Low Chol/High Fiber/JAYDEN  Code Status: Full Code    Dispo: Anticipate discharge in the next 1 to 2 days    ____________________________________________________________________________    Subjective:   Overnight Events:    pleuritic chest pain on the left  Continue pain medication   uneventful overnight        Physical Exam:  /76   Pulse 69   Temp 97.6 °F (36.4 °C) (Oral)   Resp 18   Ht 5' 8\" (1.727 m)   Wt 182 lb 9.6 oz (82.8 kg)   SpO2 95%   BMI 27.76 kg/m²   General appearance: No apparent distress, appears stated age and cooperative. HEENT: Normocephalic, atraumatic, MMM, No sclera icterus/conjuctival palor  Neck: Supple, no thyromegally. No jugular venous distention. Respiratory:  Normal respiratory effort. Clear to auscultation, no Rales/Wheezes/Rhonchi. Cardiovascular: S1/S2 without murmurs, rubs or gallops. RRR  Abdomen: Soft, non-tender, non-distended, bowel sounds present. Musculoskeletal: No clubbing, cyanosis or edema bilaterally. Skin: Skin color, texture, turgor normal.  No rashes or lesions.   Neurologic:  Cranial nerves: II-XII intact, ELIAS, No focal sensory/motor deficits  Psychiatric: Alert and oriented, thought content appropriate  Capillary Refill: Brisk,< 3 seconds   Peripheral Pulses: +2 palpable, equal bilaterally       Intake/Output Summary (Last 24 hours) at 12/29/2022 1832  Last data filed at 12/29/2022 1331  Gross per 24 hour   Intake 600 ml   Output --   Net 600 ml       Labs:   Recent Labs     12/28/22  1347   WBC 6.7   HGB 12.5   HCT 38.4         Recent Labs     12/28/22  1347      K 4.1      CO2 25   BUN 11   CREATININE 0.7   CALCIUM 9.6 AST 39*   ALT 65*   BILITOT 0.5   ALKPHOS 260*     Recent Labs     12/28/22  1347   TROPONINI <0.01       Urinalysis:    Lab Results   Component Value Date/Time    NITRU Negative 06/18/2021 07:43 PM    WBCUA 22 06/18/2021 07:43 PM    BACTERIA 1+ 06/18/2021 07:43 PM    RBCUA None seen 06/18/2021 07:43 PM    BLOODU Negative 06/18/2021 07:43 PM    SPECGRAV 1.019 06/18/2021 07:43 PM    GLUCOSEU Negative 06/18/2021 07:43 PM       Radiology:  VL Extremity Venous Bilateral   Final Result      CT CHEST PULMONARY EMBOLISM W CONTRAST   Final Result   1. Acute pulmonary embolism in a segmental branch of the left lower lobar   pulmonary artery. No evidence of right ventricular strain. 2. Multifocal patchy airspace opacities with a peripheral lower zone   predominance. Overall pattern would favor a viral pattern of pneumonia. 3. Asymmetric reactive mediastinal and right hilar lymph nodes. Findings were discussed with CLAUDETTE SALDAÑA at 3:28 pm on 12/28/2022. XR CHEST PORTABLE   Final Result   Patchy left basal opacities that could reflect atelectasis or pneumonia. Radiographic follow-up may be helpful. Warren Whitaker MD      Please excuse brevity and/or typos. This report was transcribed using voice recognition software. Every effort was made to ensure accuracy, however, inadvertent computerized transcription errors may be present.

## 2022-12-29 NOTE — CONSULTS
Pharmacy to check patient copay for Eliquis    Patient has insurance through the [place]- we are unable to fill her prescription using insurance or give price points. Pharmacy will continue to follow the decision on therapy and  the patient if appropriate.        Easton Christopher PharmD, [address] [facility] Specialist  [id number]

## 2022-12-30 VITALS
WEIGHT: 182 LBS | BODY MASS INDEX: 27.58 KG/M2 | SYSTOLIC BLOOD PRESSURE: 129 MMHG | HEIGHT: 68 IN | RESPIRATION RATE: 19 BRPM | HEART RATE: 82 BPM | OXYGEN SATURATION: 92 % | DIASTOLIC BLOOD PRESSURE: 78 MMHG | TEMPERATURE: 98 F

## 2022-12-30 PROBLEM — I82.403 ACUTE DEEP VEIN THROMBOSIS (DVT) OF BOTH LOWER EXTREMITIES (HCC): Status: ACTIVE | Noted: 2022-12-30

## 2022-12-30 LAB
C-REACTIVE PROTEIN: 78.3 MG/L (ref 0–5.1)
D DIMER: 2.27 UG/ML FEU (ref 0–0.6)
FIBRINOGEN: 525 MG/DL (ref 207–509)
INR BLD: 1.37 (ref 0.87–1.14)
PROTHROMBIN TIME: 16.8 SEC (ref 11.7–14.5)

## 2022-12-30 PROCEDURE — G0378 HOSPITAL OBSERVATION PER HR: HCPCS

## 2022-12-30 PROCEDURE — 85379 FIBRIN DEGRADATION QUANT: CPT

## 2022-12-30 PROCEDURE — 6370000000 HC RX 637 (ALT 250 FOR IP): Performed by: INTERNAL MEDICINE

## 2022-12-30 PROCEDURE — 86140 C-REACTIVE PROTEIN: CPT

## 2022-12-30 PROCEDURE — 85384 FIBRINOGEN ACTIVITY: CPT

## 2022-12-30 PROCEDURE — 36415 COLL VENOUS BLD VENIPUNCTURE: CPT

## 2022-12-30 PROCEDURE — 85610 PROTHROMBIN TIME: CPT

## 2022-12-30 RX ORDER — OXYCODONE HYDROCHLORIDE AND ACETAMINOPHEN 5; 325 MG/1; MG/1
1 TABLET ORAL EVERY 8 HOURS PRN
Qty: 20 TABLET | Refills: 0 | Status: SHIPPED | OUTPATIENT
Start: 2022-12-30 | End: 2023-01-06

## 2022-12-30 RX ADMIN — OXYCODONE AND ACETAMINOPHEN 1 TABLET: 5; 325 TABLET ORAL at 04:14

## 2022-12-30 RX ADMIN — APIXABAN 10 MG: 5 TABLET, FILM COATED ORAL at 09:09

## 2022-12-30 RX ADMIN — OXYCODONE AND ACETAMINOPHEN 1 TABLET: 5; 325 TABLET ORAL at 09:09

## 2022-12-30 ASSESSMENT — PAIN DESCRIPTION - ORIENTATION
ORIENTATION: LEFT
ORIENTATION: LEFT

## 2022-12-30 ASSESSMENT — PAIN DESCRIPTION - LOCATION
LOCATION: RIB CAGE
LOCATION: RIB CAGE

## 2022-12-30 ASSESSMENT — PAIN SCALES - GENERAL
PAINLEVEL_OUTOF10: 10
PAINLEVEL_OUTOF10: 9

## 2022-12-30 NOTE — PROGRESS NOTES
Data- discharge order received, pt verbalized agreement to discharge, disposition to previous residence, no needs for HHC/DME. Action- discharge instructions prepared and given to pt, pt verbalized understanding. Medication information packet given r/t NEW and/or CHANGED prescriptions emphasizing name/purpose/side effects, pt verbalized understanding. Discharge instruction summary: Diet- cardiac, Activity- as tolerated, Primary Care Physician as followsLee Eduinarron [phone] f/u appointment as scheduled, immunizations reviewed, prescription medications filled in outpatient pharmacy and given to her at prior to leaving. 1. WEIGHT: Admit Weight: 175 lb (79.4 kg) (12/28/22 1312)        Today  Weight: 182 lb (82.6 kg) (12/30/22 0721)       2. O2 SAT.: SpO2: 92 % (12/30/22 1102)    Response- Pt belongings gathered, IV removed. Disposition is home (no HHC/DME needs), transported with personal belongings, taken to lobby via w/c w/ RN, no complications.

## 2022-12-30 NOTE — DISCHARGE SUMMARY
Hospital Medicine Discharge Summary    Patient ID: Garrett Kenyon      Patient's PCP: Edin Aguilera    Admit Date: 12/28/2022     Discharge Date:   12/30/2022     Admitting Provider: Arsenio King MD     Discharge Provider: Avery Lin MD     Discharge Diagnoses: Active Hospital Problems    Diagnosis     Acute deep vein thrombosis (DVT) of both lower extremities (HCC) [I82.403]      Priority: Medium    Acute pulmonary embolism without acute cor pulmonale, unspecified pulmonary embolism type (HCC) [I26.99]      Priority: Medium    COVID-19 [U07.1]      Priority: Medium    Tobacco abuse [Z72.0]      Priority: Medium    Suspected chronic obstructive pulmonary disease based on initial evaluation [facility]) [J44.9]      Priority: Medium       The patient was seen and examined on day of discharge and this discharge summary is in conjunction with any daily progress note from day of discharge. Hospital Course: The patient is a 68-year-old lady with history of depression, recently diagnosed with COVID-19 pneumonia who presented with fatigue, loss of appetite, nausea and diarrhea, new onset shortness of breath with pleuritic chest pain found to have left lower lobe pulmonary artery pulmonary embolus and bilateral chest imaging. Patient with no hypoxia. CT chest done 12/20/2022 noted for acute PE in the left lower lobe with no evidence of right heart strain. Bilateral lower extremity Dopplers revealed acute totally occluding superficial venous thrombosis of the right and left greater saphenous veins.   Patient was consulted by hematology and recommended continuing on Eliquis for 3 to 6 months  She has pleuritic chest pain on the left side which is anticipated with PE recommendations at discharge  Follow-up with PCP in 1 to 2 weeks          Physical Exam Performed:     /78   Pulse 82   Temp 98 °F (36.7 °C) (Oral)   Resp 19   Ht 5' 8\" (1.727 m)   Wt 182 lb (82.6 kg)   SpO2 92%   BMI 27.67 kg/m²       General appearance:  No apparent distress, appears stated age and cooperative. HEENT:  Normal cephalic, atraumatic without obvious deformity. Pupils equal, round, and reactive to light. Extra ocular muscles intact. Conjunctivae/corneas clear. Neck: Supple, with full range of motion. No jugular venous distention. Trachea midline. Respiratory:  Normal respiratory effort. Clear to auscultation, bilaterally without Rales/Wheezes/Rhonchi. Cardiovascular:  Regular rate and rhythm with normal S1/S2 without murmurs, rubs or gallops. Abdomen: Soft, non-tender, non-distended with normal bowel sounds. Musculoskeletal:  No clubbing, cyanosis or edema bilaterally. Full range of motion without deformity. Skin: Skin color, texture, turgor normal.  No rashes or lesions. Neurologic:  Neurovascularly intact without any focal sensory/motor deficits. Cranial nerves: II-XII intact, grossly non-focal.  Psychiatric:  Alert and oriented, thought content appropriate, normal insight  Capillary Refill: Brisk,< 3 seconds   Peripheral Pulses: +2 palpable, equal bilaterally       Labs: For convenience and continuity at follow-up the following most recent labs are provided:      CBC:    Lab Results   Component Value Date/Time    WBC 6.7 12/28/2022 01:47 PM    HGB 12.5 12/28/2022 01:47 PM    HCT 38.4 12/28/2022 01:47 PM     12/28/2022 01:47 PM       Renal:    Lab Results   Component Value Date/Time     12/28/2022 01:47 PM    K 4.1 12/28/2022 01:47 PM     12/28/2022 01:47 PM    CO2 25 12/28/2022 01:47 PM    BUN 11 12/28/2022 01:47 PM    CREATININE 0.7 12/28/2022 01:47 PM    CALCIUM 9.6 12/28/2022 01:47 PM         Significant Diagnostic Studies    Radiology:   VL Extremity Venous Bilateral   Final Result      CT CHEST PULMONARY EMBOLISM W CONTRAST   Final Result   1. Acute pulmonary embolism in a segmental branch of the left lower lobar   pulmonary artery. No evidence of right ventricular strain.    2. Multifocal patchy airspace opacities with a peripheral lower zone   predominance. Overall pattern would favor a viral pattern of pneumonia. 3. Asymmetric reactive mediastinal and right hilar lymph nodes. Findings were discussed with CLAUDETTE SALDAÑA at 3:28 pm on 12/28/2022. XR CHEST PORTABLE   Final Result   Patchy left basal opacities that could reflect atelectasis or pneumonia. Radiographic follow-up may be helpful. Consults:     IP CONSULT TO HOSPITALIST  IP CONSULT TO ONCOLOGY    Disposition: Home    Condition at Discharge: Stable    Discharge Instructions/Follow-up:    PCP follow-up in 1 to 2 weeks    Code Status:  Full Code     Activity: activity as tolerated    Diet: regular diet      Discharge Medications:     Current Discharge Medication List             Details   oxyCODONE-acetaminophen (PERCOCET) 5-325 MG per tablet Take 1 tablet by mouth every 8 hours as needed for Pain for up to 7 days. Max Daily Amount: 3 tablets  Qty: 20 tablet, Refills: 0    Comments: Reduce doses taken as pain becomes manageable  Associated Diagnoses: Single subsegmental pulmonary embolism without acute cor pulmonale (HCC)      apixaban starter pack (ELIQUIS DVT/PE STARTER PACK) 5 MG TBPK tablet Take 1 tablet by mouth See Admin Instructions  Qty: 74 tablet, Refills: 0                Details   gabapentin (NEURONTIN) 300 MG capsule Take 300 mg by mouth daily. Time Spent on discharge: 30 minutes in the examination, evaluation, counseling and review of medications and discharge plan. Signed: Gómez Black MD   12/30/2022      Thank you Ernesto Pires for the opportunity to be involved in this patient's care. If you have any questions or concerns, please feel free to contact me at [phone].

## 2022-12-30 NOTE — RT PROTOCOL NOTE
RT Inhaler-Nebulizer Bronchodilator Protocol Note    There is a bronchodilator order in the chart from a provider indicating to follow the RT Bronchodilator Protocol and there is an Initiate RT Inhaler-Nebulizer Bronchodilator Protocol order as well (see protocol at bottom of note). CXR Findings:  XR CHEST PORTABLE    Result Date: 12/28/2022  Patchy left basal opacities that could reflect atelectasis or pneumonia. Radiographic follow-up may be helpful. The findings from the last RT Protocol Assessment were as follows:   History Pulmonary Disease: Smoker 15 pack years or more  Respiratory Pattern: Regular pattern and RR 12-20 bpm  Breath Sounds: Slightly diminished and/or crackles  Cough: Strong, spontaneous, non-productive  Indication for Bronchodilator Therapy:    Bronchodilator Assessment Score: 3    Aerosolized bronchodilator medication orders have been revised according to the RT Inhaler-Nebulizer Bronchodilator Protocol below. Respiratory Therapist to perform RT Therapy Protocol Assessment initially then follow the protocol. Repeat RT Therapy Protocol Assessment PRN for score 0-3 or on second treatment, BID, and PRN for scores above 3. No Indications - adjust the frequency to every 6 hours PRN wheezing or bronchospasm, if no treatments needed after 48 hours then discontinue using Per Protocol order mode. If indication present, adjust the RT bronchodilator orders based on the Bronchodilator Assessment Score as indicated below. Use Inhaler orders unless patient has one or more of the following: on home nebulizer, not able to hold breath for 10 seconds, is not alert and oriented, cannot activate and use MDI correctly, or respiratory rate 25 breaths per minute or more, then use the equivalent nebulizer order(s) with same Frequency and PRN reasons based on the score. If a patient is on this medication at home then do not decrease Frequency below that used at home.     0-3 - enter or revise RT bronchodilator order(s) to equivalent RT Bronchodilator order with Frequency of every 4 hours PRN for wheezing or increased work of breathing using Per Protocol order mode. 4-6 - enter or revise RT Bronchodilator order(s) to two equivalent RT bronchodilator orders with one order with BID Frequency and one order with Frequency of every 4 hours PRN wheezing or increased work of breathing using Per Protocol order mode. 7-10 - enter or revise RT Bronchodilator order(s) to two equivalent RT bronchodilator orders with one order with TID Frequency and one order with Frequency of every 4 hours PRN wheezing or increased work of breathing using Per Protocol order mode. 11-13 - enter or revise RT Bronchodilator order(s) to one equivalent RT bronchodilator order with QID Frequency and an Albuterol order with Frequency of every 4 hours PRN wheezing or increased work of breathing using Per Protocol order mode. Greater than 13 - enter or revise RT Bronchodilator order(s) to one equivalent RT bronchodilator order with every 4 hours Frequency and an Albuterol order with Frequency of every 2 hours PRN wheezing or increased work of breathing using Per Protocol order mode. RT to enter RT Home Evaluation for COPD & MDI Assessment order using Per Protocol order mode.     Electronically signed by Mandie Villela RCP on 12/30/2022 at 6:33 AM

## 2022-12-30 NOTE — PROGRESS NOTES
CLINICAL PHARMACY NOTE: MEDS TO BEDS    Total # of Prescriptions Filled: 2   The following medications were delivered to the patient:  Eliquis starter pack  Oxycodone/APAP 5/325mg    Additional Documentation:     Medications were picked up in the Outpatient Pharmacy by KENNY Price CPhT

## 2022-12-30 NOTE — CONSULTS
[facility]    Consult Note      Requesting Physician:  Adriana De La O    CHIEF COMPLAINT:  Bilateral LE DVT and PE      HISTORY OF PRESENT ILLNESS:    Ms. Roddy Marquez  is a 57 y.o. female we are seeing in consultation for bilateral LE DVT and PE. The patient presented to the ER with complaints of shortness of breath and chest pain. CT chest done on 12/28/2022 revealed acute PE in the LLL with no evidence of right heart strain along with pneumonia. BLE dopplers done on 12/29/2022 revealed an acute totally occluding superficial venous thrombosis the right and left greater saphenous vein. She has been started on Eliquis. She reports she was diagnosed with COVID on 12/15/2022. She denies any prior history of blood clots. No recent surgeries or travel. She is a current 1ppd smoker. She is complaining of pain when she takes a deep a breath. She denies any pain or swelling her legs. Past Medical History:  Past Medical History:   Diagnosis Date    Depression        Past Surgical History:  No past surgical history on file.     Current Medications:  Current Facility-Administered Medications   Medication Dose Route Frequency Provider Last Rate Last Admin    perflutren lipid microspheres (DEFINITY) injection 1.5 mL  1.5 mL IntraVENous ONCE PRN Warren Kaur MD        oxyCODONE-acetaminophen (PERCOCET) 5-325 MG per tablet 1 tablet  1 tablet Oral Q4H PRN Warren Kaur MD   1 tablet at 12/30/22 0909    apixaban (ELIQUIS) tablet 10 mg  10 mg Oral BID Diana Darling MD   10 mg at 12/30/22 4788    Followed by    Dennard Gosselin ON 1/4/2023] apixaban (ELIQUIS) tablet 5 mg  5 mg Oral BID Diana Darling MD        sodium chloride flush 0.9 % injection 10 mL  10 mL IntraVENous PRN Diana Darling MD        0.9 % sodium chloride infusion   IntraVENous PRN Diana Darling MD potassium chloride (KLOR-CON M) extended release tablet 40 mEq  40 mEq Oral PRN Kerri Ferris MD        Or    potassium bicarb-citric acid (EFFER-K) effervescent tablet 40 mEq  40 mEq Oral PRN Kerri Ferris MD        Or    potassium chloride 10 mEq/100 mL IVPB (Peripheral Line)  10 mEq IntraVENous PRN Kerri Ferris MD        magnesium sulfate 1000 mg in dextrose 5% 100 mL IVPB  1,000 mg IntraVENous PRN Kerri Ferris MD        ondansetron (ZOFRAN-ODT) disintegrating tablet 4 mg  4 mg Oral Q8H PRN Kerri Ferris MD        Or    ondansetron [facility]) injection 4 mg  4 mg IntraVENous Q6H PRN Kerri Ferris MD        acetaminophen (TYLENOL) tablet 650 mg  650 mg Oral Q6H PRN Kerri Ferris MD   650 mg at 12/29/22 1017    Or    acetaminophen (TYLENOL) suppository 650 mg  650 mg Rectal Q6H PRN Kerri Ferris MD        nicotine (NICODERM CQ) 14 MG/24HR 1 patch  1 patch TransDERmal Daily Kerri Ferris MD   1 patch at 12/30/22 9623    melatonin tablet 3 mg  3 mg Oral Nightly PRN Kreri Ferris MD   3 mg at 12/29/22 2001    calcium carbonate (TUMS) chewable tablet 1,000 mg  1,000 mg Oral TID PRN Kerri Ferris MD        ipratropium-albuterol (DUONEB) nebulizer solution 1 ampule  1 ampule Inhalation 4x Daily PRN Kerri Ferris MD           Allergies:  No Known Allergies    Social History:  Social History     Socioeconomic History    Marital status: Legally      Spouse name: Not on file    Number of children: Not on file    Years of education: Not on file    Highest education level: Not on file   Occupational History    Not on file   Tobacco Use    Smoking status: Every Day     Packs/day: 1.00     Types: Cigarettes    Smokeless tobacco: Not on file   Substance and Sexual Activity    Alcohol use: Not on file    Drug use: Not on file    Sexual activity: Not on file   Other Topics Concern    Not on file   Social History Narrative    Not on file     Social Determinants of Health Financial Resource Strain: Not on file   Food Insecurity: Not on file   Transportation Needs: Not on file   Physical Activity: Not on file   Stress: Not on file   Social Connections: Not on file   Intimate Partner Violence: Not on file   Housing Stability: Not on file          Family History:  No family history on file. REVIEW OF SYSTEMS:      Constitutional: Denies fever, sweats, weight loss  Eyes: No visual changes or diplopia. No scleral icterus. Ent: No headaches, hearing loss or vertigo. No mouth sores or sore throat. Cardiovascular: No chest pain, dyspnea on exertion, palpitations or loss of consciousness. Respiratory: No cough or wheezing, no sputum production. No hemoptysis. Gastrointestinal: No abdominal pain, appetite loss, blood in stools. No change in bowel habits. Genitourinary: No dysuria, trouble voiding, or hematuria. Musculoskeletal: No generalized weakness. No joint complaints. Integumentary: No rash or pruritus. Neurological: No headache, diplopia. No change in gait, balance, or coordination. No paresthesias. Endocrine: No temperature intolerance. No excessive thirst, fluid intake, urination. Hematologic/lymphatic: No abnormal bruising or ecchymosis, blood clots or swollen lymph nodes. Allergic/immunologic: No nasal congestion or hives.         PHYSICAL EXAM:      Vitals:  Patient Vitals for the past 24 hrs:   BP Temp Temp src Pulse Resp SpO2 Weight   12/30/22 0900 (!) 152/90 97.9 °F (36.6 °C) Oral 88 22 92 % --   12/30/22 0721 -- -- -- -- -- -- 182 lb (82.6 kg)   12/30/22 0414 -- -- -- -- 20 -- --   12/30/22 0407 (!) 162/93 98.2 °F (36.8 °C) Oral 88 18 91 % --   12/29/22 2224 -- -- -- -- 18 -- --   12/29/22 1915 (!) 144/81 97.7 °F (36.5 °C) Oral 92 18 94 % --   12/29/22 1810 -- -- -- -- 18 -- --   12/29/22 1603 124/76 97.6 °F (36.4 °C) Oral 69 16 95 % --   12/29/22 1331 -- -- -- -- 18 -- --   12/29/22 1301 -- -- -- -- 18 -- --   12/29/22 1220 133/81 98.1 °F (36.7 °C) Oral 89 18 94 % --           CONSTITUTIONAL: awake, alert, cooperative, no apparent distress   EYES: pupils equal, round and reactive to light, sclera clear and conjunctiva normal  ENT: Normocephalic, without obvious abnormality, atraumatic  NECK: supple, symmetrical, no jugular venous distension and no carotid bruits   HEMATOLOGIC/LYMPHATIC: no cervical, supraclavicular or axillary lymphadenopathy   LUNGS: no increased work of breathing and clear to auscultation   CARDIOVASCULAR: regular rate and rhythm, normal S1 and S2, no murmur noted  ABDOMEN: normal bowel sounds x 4, soft, non-distended, non-tender, no masses palpated, no hepatosplenomegaly   MUSCULOSKELETAL: full range of motion noted, tone is normal  NEUROLOGIC: awake, alert, oriented to name, place and time. Motor skills grossly intact. SKIN: Normal skin color, texture, turgor and no jaundice. appears intact   EXTREMITIES: no LE edema       DATA:    PT/INR:    Recent Labs     12/30/22  0522 12/29/22  0510 12/28/22  1347   PROT  --   --  6.9   INR 1.37* 1.22* 1.03     PTT:  No results for input(s): APTT in the last 720 hours. CMP:    Recent Labs     12/28/22  1347      K 4.1      CO2 25   BUN 11   PROT 6.9     Mg:  No results for input(s): MG in the last 720 hours.     Lab Results   Component Value Date    CALCIUM 9.6 12/28/2022       CBC:    Recent Labs     12/28/22  1347   WBC 6.7   NEUTROABS 5.0   LYMPHOPCT 12.0   RBC 4.33   HGB 12.5   HCT 38.4   MCV 88.6   MCH 28.8   MCHC 32.5   RDW 13.8           LDH:  Recent Labs     12/28/22  1347   *         Radiology Review:  VL Extremity Venous Bilateral  Lower Extremities DVT Study     Demographics      Patient Name        [facility]      Date of Study       12/29/2022  Gender                 Female      Patient Number      [id number]  Date of Birth          1960      Visit Number        [id number]   Age                    57 year(s)      Accession Number    [id number]  Room Number            5505      Corporate ID        [id number]    Sonographer            Helen Mcdaniel RVT,                                                          RT      Ordering Physician              Interpreting Physician [facility]                                                          Fatimah Cardenas MD,                                                          [facility]     Procedure    Type of Study:      Veins:Lower Extremities DVT Study, VASC EXTREMITY VENOUS DUPLEX BILATERAL. Vascular Sonographer Report     Additional Indications:PE    Impressions  Right Impression  No evidence of deep vein thrombosis involving the right lower extremity. Acute totally occluding superficial venous thrombosis involving the right  greater saphenous vein. Left Impression  No evidence of deep vein vein thrombosis involving the left lower extremity. Acute totally occluding superficial venous thrombosis involving the left  greater saphenous vein. Conclusions      Summary      -Acute totally occluding superficial venous thrombosis involving the right   greater saphenous vein. -Acute totally occluding superficial venous thrombosis involving the left   greater saphenous vein. Signature      ------------------------------------------------------------------   Electronically signed by Fatimah Cardenas MD, [facility] (Interpreting   physician) on 12/29/2022 at 05:27 PM   ------------------------------------------------------------------     Patient Status:STAT. [address] [place] [address] Vascular Lab. Technical Quality:Good visualization. Velocities are measured in cm/s ; Diameters are measured in mm    Right Lower Extremities DVT Study Measurements  Right 2D Measurements  +------------------------+----------+---------------+----------+  ! Location                ! Visualized! Compressibility! Thrombosis! +------------------------+----------+---------------+----------+  ! Sapheno Femoral Junction! Yes       ! Yes !None      !  +------------------------+----------+---------------+----------+  ! GSV Thigh               ! Yes       ! No             !Acute     !  +------------------------+----------+---------------+----------+  ! Common Femoral          !Yes       ! Yes            ! None      !  +------------------------+----------+---------------+----------+  ! Femoral                 !Yes       ! Yes            ! None      !  +------------------------+----------+---------------+----------+  ! Prox Femoral            !Yes       ! Yes            ! None      !  +------------------------+----------+---------------+----------+  ! Mid Femoral             !Yes       ! Yes            ! None      !  +------------------------+----------+---------------+----------+  ! Dist Femoral            !Yes       ! Yes            ! None      !  +------------------------+----------+---------------+----------+  ! Deep Femoral            !Yes       ! Yes            ! None      !  +------------------------+----------+---------------+----------+  ! Popliteal               !Yes       ! Yes            ! None      !  +------------------------+----------+---------------+----------+  ! GSV Below Knee          ! Yes       ! No             !Acute     !  +------------------------+----------+---------------+----------+  ! Gastroc                 ! Yes       ! Yes            ! None      !  +------------------------+----------+---------------+----------+  ! PTV                     ! Yes       ! Yes            ! None      !  +------------------------+----------+---------------+----------+  ! Peroneal                !Yes       ! Yes            ! None      !  +------------------------+----------+---------------+----------+    Right Doppler Measurements  +------------------------+-----------+------+------------+  ! Location                ! Signal     !Reflux! Reflux (sec)! +------------------------+-----------+------+------------+  ! Sapheno Femoral Junction! Spontaneous!      ! !  +------------------------+-----------+------+------------+  ! Common Femoral          !Spontaneous!      !            !  +------------------------+-----------+------+------------+  ! Femoral                 !Spontaneous!      !            !  +------------------------+-----------+------+------------+  ! Deep Femoral            !Spontaneous!      !            !  +------------------------+-----------+------+------------+  ! Popliteal               !Spontaneous!      !            !  +------------------------+-----------+------+------------+    Left Lower Extremities DVT Study Measurements  Left 2D Measurements  +------------------------+----------+---------------+----------+  ! Location                ! Visualized! Compressibility! Thrombosis! +------------------------+----------+---------------+----------+  ! Sapheno Femoral Junction! Yes       ! Yes            ! None      !  +------------------------+----------+---------------+----------+  ! GSV Thigh               ! Yes       ! No             !Acute     !  +------------------------+----------+---------------+----------+  ! Common Femoral          !Yes       ! Yes            ! None      !  +------------------------+----------+---------------+----------+  ! Femoral                 !Yes       ! Yes            ! None      !  +------------------------+----------+---------------+----------+  ! Prox Femoral            !Yes       ! Yes            ! None      !  +------------------------+----------+---------------+----------+  ! Mid Femoral             !Yes       ! Yes            ! None      !  +------------------------+----------+---------------+----------+  ! Dist Femoral            !Yes       ! Yes            ! None      !  +------------------------+----------+---------------+----------+  ! Deep Femoral            !Yes       ! Yes            ! None      !  +------------------------+----------+---------------+----------+  ! Popliteal               !Yes       ! Yes            ! None !  +------------------------+----------+---------------+----------+  ! GSV Below Knee          ! Yes       ! No             !Acute     !  +------------------------+----------+---------------+----------+  ! Gastroc                 ! Yes       ! Yes            ! None      !  +------------------------+----------+---------------+----------+  ! PTV                     ! Yes       ! Yes            !          !  +------------------------+----------+---------------+----------+  ! ATV                     ! !               !None      !  +------------------------+----------+---------------+----------+  ! Peroneal                !Yes       ! Yes            ! None      !  +------------------------+----------+---------------+----------+    Left Doppler Measurements  +------------------------+-----------+------+------------+  ! Location                ! Signal     !Reflux! Reflux (sec)! +------------------------+-----------+------+------------+  ! Sapheno Femoral Junction! Spontaneous!      !            !  +------------------------+-----------+------+------------+  ! Common Femoral          !Spontaneous!      !            !  +------------------------+-----------+------+------------+  ! Femoral                 !Spontaneous!      !            !  +------------------------+-----------+------+------------+  ! Deep Femoral            !Continuous !      !            !  +------------------------+-----------+------+------------+  ! Popliteal               !Spontaneous!      !            !  +------------------------+-----------+------+------------+      Problem List  Patient Active Problem List   Diagnosis    Acute pulmonary embolism without acute cor pulmonale, unspecified pulmonary embolism type ([address]    COVID-19    Tobacco abuse    Suspected chronic obstructive pulmonary disease based on initial evaluation ([facility]    Acute deep vein thrombosis (DVT) of both lower extremities ([facility]       IMPRESSION/RECOMMENDATIONS:  68-year-old female who has:    Acute PE/SVT:  - likely provoked by recent COVID-19 infection  - CT chest on 12/28/2022 revealed acute PE in the LLL with no evidence of right heart strain   - BLE dopplers done on 12/29/2022 revealed an acute totally occluding superficial   - Echo done and unremarkable  -venous thrombosis the right and left greater saphenous vein. - She has been started on Eliquis per primary team  - would recommend 3-6 months of anticoagulation     2. COVID-19:  - managed per primary team      Thank you for asking me to see the patient. MARSHALL Davila - CNP  Please Contact Through [employer]     Patient was seen with BOBO in the morning. Acute PE/SVT. Recent COVID 19 infection. Echo was unremarkable. Now on Eliquis. 3 to 6 months of anticoagulation. Abhijit Gottlieb.  Terrence Lane MD, [place] [address]  Hematology and Oncology  [facility]  [phone]

## 2023-02-21 ENCOUNTER — HOSPITAL ENCOUNTER (OUTPATIENT)
Dept: WOMENS IMAGING | Age: 63
Discharge: HOME | End: 2023-02-21
Payer: OTHER GOVERNMENT

## 2023-02-21 VITALS — WEIGHT: 175 LBS | BODY MASS INDEX: 26.52 KG/M2 | HEIGHT: 68 IN

## 2023-02-21 DIAGNOSIS — Z12.31 VISIT FOR SCREENING MAMMOGRAM: ICD-10-CM

## 2023-02-21 PROCEDURE — 77067 SCR MAMMO BI INCL CAD: CPT

## 2023-03-02 ENCOUNTER — PRE-PROCEDURE TELEPHONE (OUTPATIENT)
Dept: WOMENS IMAGING | Age: 63
End: 2023-03-02

## 2023-03-02 ENCOUNTER — HOSPITAL ENCOUNTER (OUTPATIENT)
Dept: ULTRASOUND IMAGING | Age: 63
Discharge: HOME | End: 2023-03-02
Payer: OTHER GOVERNMENT

## 2023-03-02 ENCOUNTER — HOSPITAL ENCOUNTER (OUTPATIENT)
Dept: WOMENS IMAGING | Age: 63
Discharge: HOME | End: 2023-03-02
Payer: OTHER GOVERNMENT

## 2023-03-02 DIAGNOSIS — R92.8 ABNORMAL MAMMOGRAM: ICD-10-CM

## 2023-03-02 DIAGNOSIS — R92.8 FOLLOW-UP EXAMINATION OF ABNORMAL MAMMOGRAM: ICD-10-CM

## 2023-03-02 PROCEDURE — 76642 ULTRASOUND BREAST LIMITED: CPT

## 2023-03-02 PROCEDURE — 77066 DX MAMMO INCL CAD BI: CPT

## 2023-03-02 NOTE — PROGRESS NOTES
Imaging Navigator reviewed pre-procedure ultrasound guided breast biopsy patient education information in person and gave written instructions. Reviewed medications and need to hold all blood thinners per instructions. Eliquis for 1 day   Patient should take all other medications as prescribed. Patient can eat and drink as normal prior to the procedure. Be sure to wear a bra with good support and a two piece outfit for comfort. Patient can bring someone with you but you can also drive yourself. Plan on being at the breast center for 2-2 and a half hours. Reviewed the process of a ultrasound biopsy. The skin is cleaned and a local anesthetic is given to numb the area. A small skin nick is made for the biopsy needle and then tissue samples are taken. A tiny marker is then placed inside your breast at the site of the biopsy for future reference. Pressure is then held on the biopsy site to stop bleeding and steri strips, bandage and waterproof dressing is applied. A mammogram is then done to validate the tissue marker. The tissue sample is sent to pathology. Results will come back in 2-3 business days and sent to your referring physician. Either they or the nurse navigator will call you with the results and recommended follow up needed. Patients states understanding.